# Patient Record
Sex: FEMALE | Race: BLACK OR AFRICAN AMERICAN | Employment: UNEMPLOYED | URBAN - METROPOLITAN AREA
[De-identification: names, ages, dates, MRNs, and addresses within clinical notes are randomized per-mention and may not be internally consistent; named-entity substitution may affect disease eponyms.]

---

## 2017-01-19 ENCOUNTER — OFFICE VISIT (OUTPATIENT)
Dept: FAMILY MEDICINE CLINIC | Age: 32
End: 2017-01-19

## 2017-01-19 VITALS
HEART RATE: 100 BPM | WEIGHT: 293 LBS | BODY MASS INDEX: 50.02 KG/M2 | OXYGEN SATURATION: 100 % | TEMPERATURE: 97.6 F | SYSTOLIC BLOOD PRESSURE: 124 MMHG | RESPIRATION RATE: 20 BRPM | HEIGHT: 64 IN | DIASTOLIC BLOOD PRESSURE: 82 MMHG

## 2017-01-19 DIAGNOSIS — G89.29 CHRONIC LEFT SHOULDER PAIN: Primary | ICD-10-CM

## 2017-01-19 DIAGNOSIS — M25.512 CHRONIC LEFT SHOULDER PAIN: Primary | ICD-10-CM

## 2017-01-19 NOTE — PROGRESS NOTES
HISTORY OF PRESENT ILLNESS  Augustine Holstein is a 36 y.o. female. HPI  Patient comes in today for left shoulder pain. Seem by Dr. Castillo Jennings 16. States she was supposed to go back to work, states she is on medical leave. Has not been able to do outpt PT because she did not have insurance and could not afford. When seen on 16, stated left shoulder still stiff. 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 Last year, was at [facility], vent on head and left shoulder. Has spasms off an on. 3 weeks ago, picked up daughter, and had a sharp pain in shoulder. Initially was unable to turn head. Has tried flexeril and Naprosyn. Only putting her to sleep. Denies numbness or tingling in left arm. On 16, she continued to have left shoulder pain, went to PT once. States shoulder worse after therapy. States shoulder was more swollen after PT. Went to [facility] PT. Did HEP, hot and cold compresses. Tramadol and prednisone did not help. Was taking Flexeril - only helped her to sleep. Denies numbness or tingling in left arm  Today states pain is unchanged. States she did not get insurance this month. Works at [employer], has demerits there, still employed, just needs updated FMLA form. Taking hydrocodone at night with some relief.   No Known Allergies    Past Medical History   Diagnosis Date    BMI 50.0-59.9, adult ([address] 2016    Chlamydia     Complication of anesthesia      spinal had to be placed twice last time    Obesities, morbid ([address]        Past Surgical History   Procedure Laterality Date    Pr  delivery only       x3    Hx gyn         Social History     Social History    Marital status: SINGLE     Spouse name: N/A    Number of children: N/A    Years of education: N/A     Occupational History    [employer]      Social History Main Topics    Smoking status: Former Smoker     Packs/day: 0.25     Years: 1.00     Quit date: 2016   [address] [place] [address] Smokeless tobacco: Never Used    Alcohol use No      Comment: quit approx. 1 mo. prior to pregnancy    Drug use: No    Sexual activity: Yes     Partners: Male     Birth control/ protection: Implant     Other Topics Concern    Not on file     Social History Narrative       Family History   Problem Relation Age of Onset    Asthma Brother     Cancer Maternal Grandfather     Stroke Maternal Grandfather     Cancer Paternal Grandfather     Psychiatric Disorder Mother     No Known Problems Father        Current Outpatient Prescriptions   Medication Sig    HYDROcodone-acetaminophen (NORCO) 5-325 mg per tablet Take 1 Tab by mouth every eight (8) hours as needed for Pain. Max Daily Amount: 3 Tabs.  etonogestrel 68 mg impl by SubDERmal route.  predniSONE (STERAPRED DS) 10 mg dose pack See administration instruction per 10mg dose pack    cyclobenzaprine (FLEXERIL) 10 mg tablet Take 0.5 Tabs by mouth three (3) times daily as needed for Muscle Spasm(s). No current facility-administered medications for this visit. Review of Systems   Musculoskeletal: Positive for joint pain (left shoulder pain) and myalgias. Skin: Negative. Neurological: Negative for tingling, sensory change and focal weakness. Vitals:    01/19/17 1003   BP: 124/82   Pulse: 100   Resp: 20   Temp: 97.6 °F (36.4 °C)   TempSrc: Oral   SpO2: 100%   Weight: 303 lb (137.4 kg)   Height: 5' 4\" (1.626 m)     Physical Exam   Constitutional: She is oriented to person, place, and time. Vital signs are normal. She appears well-developed and well-nourished. She is cooperative. Neck: Muscular tenderness (left trapezius) present. No spinous process tenderness present. Normal range of motion present. Cardiovascular: Normal rate. Pulses:       Radial pulses are 2+ on the right side, and 2+ on the left side.    Pulmonary/Chest: Effort normal.   Musculoskeletal:        Left shoulder: She exhibits tenderness (left trapezius), bony tenderness (over ac joint) and pain. She exhibits normal range of motion, no swelling, no effusion, no crepitus, no deformity, no spasm, normal pulse and normal strength. Muscle strength 5/5 BUEs, sensation intact   Neurological: She is alert and oriented to person, place, and time. Skin: Skin is warm and dry. Vitals reviewed. ASSESSMENT and PLAN    ICD-10-CM ICD-9-CM    1. Chronic left shoulder pain M25.512 719.41     G89.29 338.29      Encounter Diagnoses   Name Primary?  Chronic left shoulder pain Yes     No orders of the defined types were placed in this encounter. Amanda Monge was seen today for shoulder pain. Diagnoses and all orders for this visit:    Chronic left shoulder pain - continue hydrocodone prn pain. Patient unable to attend PT as she is without health insurance, is not working, and cannot afford. Continue HEP. Appointment scheduled with Dr. Jeff Jeter on 2/16/17 as patient should be covered under [employer] at that time. Completed [place] paperwork for [employer] (employer). Total visit time spent in direct patient care/contact:  25-39 minutes  Greater than 50% of visit time was spent counseling and coordinating care. Follow-up Disposition:  Return if symptoms worsen or fail to improve. I have reviewed the patient's allergies and made any necessary changes. Medical, procedural, social and family histories have been reviewed and updated as medically indicated. I have reconciled and/or revised patient medications in the EMR. I have discussed each diagnosis listed in this note with Saeed Gutiérrez and/or their family. I have discussed treatment options and the risk/benefit analysis of those options, including safe use of medications and possible medication side effects. Through the use of shared decision making we have agreed to the above plan. The patient has received an after-visit summary and questions were answered concerning future plans. Jaleesa Payne, CHRISSY-C    This note will not be viewable in [address]

## 2017-01-19 NOTE — LETTER
1/19/2017 10:49 AM 
 
Ms. Saeed [phone] Appointment  On February 16th, 2017 at 1pm with Dr. Nay Holt [address] [place] [address], [address] [place] [address] If need to change your appt or cancel please call at [phone] Sincerely, Noel Bosworth, NP

## 2017-02-07 ENCOUNTER — TELEPHONE (OUTPATIENT)
Dept: FAMILY MEDICINE CLINIC | Age: 32
End: 2017-02-07

## 2017-02-07 DIAGNOSIS — M25.512 CHRONIC LEFT SHOULDER PAIN: ICD-10-CM

## 2017-02-07 DIAGNOSIS — G89.29 CHRONIC LEFT SHOULDER PAIN: ICD-10-CM

## 2017-02-08 RX ORDER — HYDROCODONE BITARTRATE AND ACETAMINOPHEN 5; 325 MG/1; MG/1
1 TABLET ORAL
Qty: 20 TAB | Refills: 0 | Status: SHIPPED | OUTPATIENT
Start: 2017-02-08 | End: 2019-07-18

## 2017-03-16 ENCOUNTER — OFFICE VISIT (OUTPATIENT)
Dept: FAMILY MEDICINE CLINIC | Age: 32
End: 2017-03-16

## 2017-03-16 VITALS
SYSTOLIC BLOOD PRESSURE: 145 MMHG | RESPIRATION RATE: 20 BRPM | WEIGHT: 293 LBS | BODY MASS INDEX: 50.02 KG/M2 | TEMPERATURE: 97.4 F | HEIGHT: 64 IN | HEART RATE: 100 BPM | DIASTOLIC BLOOD PRESSURE: 98 MMHG | OXYGEN SATURATION: 99 %

## 2017-03-16 DIAGNOSIS — G89.29 CHRONIC LEFT SHOULDER PAIN: Primary | ICD-10-CM

## 2017-03-16 DIAGNOSIS — M25.512 CHRONIC LEFT SHOULDER PAIN: Primary | ICD-10-CM

## 2017-03-16 NOTE — PROGRESS NOTES
HISTORY OF PRESENT ILLNESS  Deyanira Bradley is a 36 y.o. female. HPI  Patient comes in today for left shoulder pain. Saw Dr. Yuriy Jones end of February, had cortisone injection. MRI was scheduled for 3/9/17. Did not have MRI because she did not have $258 to pay [facility] to cover copay for [facility]. States she is still having left shoulder pain. Unable to work until she has further workup. States left shoulder is still popping. No Known Allergies    Past Medical History:   Diagnosis Date    BMI 50.0-59.9, adult ([facility] 2016    Chlamydia     Complication of anesthesia     spinal had to be placed twice last time    Obesities, morbid ([facility]        Past Surgical History:   Procedure Laterality Date     DELIVERY ONLY      x3    HX GYN         Social History     Social History    Marital status: SINGLE     Spouse name: N/A    Number of children: N/A    Years of education: N/A     Occupational History    [employer]      Social History Main Topics    Smoking status: Former Smoker     Packs/day: 0.25     Years: 1.00     Quit date: 2016    Smokeless tobacco: Never Used    Alcohol use No      Comment: quit approx. 1 mo. prior to pregnancy    Drug use: No    Sexual activity: Yes     Partners: Male     Birth control/ protection: Implant     Other Topics Concern    Not on file     Social History Narrative       Family History   Problem Relation Age of Onset    Asthma Brother     Cancer Maternal Grandfather     Stroke Maternal Grandfather     Cancer Paternal Grandfather     Psychiatric Disorder Mother     No Known Problems Father        Current Outpatient Prescriptions   Medication Sig    HYDROcodone-acetaminophen (NORCO) 5-325 mg per tablet Take 1 Tab by mouth every eight (8) hours as needed for Pain. Max Daily Amount: 3 Tabs.  etonogestrel 68 mg impl by SubDERmal route. No current facility-administered medications for this visit.       Review of Systems   Musculoskeletal: Positive for joint pain (left shoulder pain) and myalgias. Skin: Negative. Neurological: Negative for tingling, sensory change and focal weakness. Vitals:    03/16/17 1526   BP: (!) 145/98   Pulse: 100   Resp: 20   Temp: 97.4 °F (36.3 °C)   TempSrc: Oral   SpO2: 99%   Weight: 309 lb (140.2 kg)   Height: 5' 4\" (1.626 m)     Physical Exam   Constitutional: She is oriented to person, place, and time. Vital signs are normal. She appears well-developed and well-nourished. She is cooperative. Neck: Muscular tenderness (left trapezius) present. No spinous process tenderness present. Normal range of motion present. Cardiovascular: Normal rate. Pulses:       Radial pulses are 2+ on the right side, and 2+ on the left side. Pulmonary/Chest: Effort normal.   Musculoskeletal:        Left shoulder: She exhibits tenderness (left trapezius), bony tenderness (over ac joint) and pain. She exhibits normal range of motion, no swelling, no effusion, no crepitus, no deformity, no spasm, normal pulse and normal strength. Muscle strength 5/5 BUEs, sensation intact   Neurological: She is alert and oriented to person, place, and time. Skin: Skin is warm and dry. Vitals reviewed. ASSESSMENT and PLAN    ICD-10-CM ICD-9-CM    1. Chronic left shoulder pain M25.512 719.41     G89.29 338.29      Encounter Diagnoses   Name Primary?  Chronic left shoulder pain Yes     No orders of the defined types were placed in this encounter. April Bahena was seen today for shoulder pain. Diagnoses and all orders for this visit:    Chronic left shoulder pain  -     Spoke with Crystal in Dr. Keke Thomas office. She will arrange for patient to have MRI at a [facility] facility. Discussed with patient importance of obtaining MRI and need to follow up with Dr. Keke Thomas. Dr. Keke Thomas office will call patient with date/time fo rMRI of left shoulder.         Follow-up Disposition: Not on File    I have reviewed the patient's allergies and made any necessary changes. Medical, procedural, social and family histories have been reviewed and updated as medically indicated. I have reconciled and/or revised patient medications in the EMR. I have discussed each diagnosis listed in this note with Ovidio Roberson and/or their family. I have discussed treatment options and the risk/benefit analysis of those options, including safe use of medications and possible medication side effects. Through the use of shared decision making we have agreed to the above plan. The patient has received an after-visit summary and questions were answered concerning future plans. Jaleesa Payne, EUGENIOP-C    This note will not be viewable in [facility].

## 2019-04-29 ENCOUNTER — TELEPHONE (OUTPATIENT)
Dept: FAMILY MEDICINE CLINIC | Age: 34
End: 2019-04-29

## 2019-04-29 NOTE — TELEPHONE ENCOUNTER
Spoke with pt and she states she fell and hit her stomach on the ground. Confirmed pt is pregnant and per Jessica Evans NP pt needs to go to an OB. Pt states does not have insurance at this time. Gave pt number to Milena Banks NP at [facility] to have pt get in a be seen. Pt verbalized understanding.

## 2019-04-29 NOTE — TELEPHONE ENCOUNTER
Pt p#[phone], pt would like to be seen, she is pregnant and fell down yeseterday, states she is banged up and needs to be seen.

## 2019-07-18 ENCOUNTER — HOSPITAL ENCOUNTER (OUTPATIENT)
Age: 34
Discharge: HOME | End: 2019-07-19
Attending: EMERGENCY MEDICINE | Admitting: SPECIALIST
Payer: MEDICAID

## 2019-07-18 DIAGNOSIS — O16.3 HYPERTENSION AFFECTING PREGNANCY IN THIRD TRIMESTER: Primary | ICD-10-CM

## 2019-07-18 DIAGNOSIS — R51.9 HEADACHE IN PREGNANCY, ANTEPARTUM, THIRD TRIMESTER: ICD-10-CM

## 2019-07-18 DIAGNOSIS — H53.8 BLURRY VISION: ICD-10-CM

## 2019-07-18 DIAGNOSIS — O26.893 HEADACHE IN PREGNANCY, ANTEPARTUM, THIRD TRIMESTER: ICD-10-CM

## 2019-07-18 DIAGNOSIS — Z3A.28 28 WEEKS GESTATION OF PREGNANCY: ICD-10-CM

## 2019-07-18 PROCEDURE — 75810000275 HC EMERGENCY DEPT VISIT NO LEVEL OF CARE

## 2019-07-18 RX ORDER — GUAIFENESIN 100 MG/5ML
81 LIQUID (ML) ORAL DAILY
COMMUNITY

## 2019-07-18 RX ORDER — LABETALOL 200 MG/1
200 TABLET, FILM COATED ORAL 2 TIMES DAILY
COMMUNITY

## 2019-07-19 VITALS
WEIGHT: 293 LBS | HEART RATE: 99 BPM | BODY MASS INDEX: 47.09 KG/M2 | SYSTOLIC BLOOD PRESSURE: 116 MMHG | HEIGHT: 66 IN | TEMPERATURE: 98.7 F | OXYGEN SATURATION: 99 % | DIASTOLIC BLOOD PRESSURE: 86 MMHG | RESPIRATION RATE: 18 BRPM

## 2019-07-19 PROBLEM — O10.913 CHRONIC HYPERTENSION COMPLICATING OR REASON FOR CARE DURING PREGNANCY, THIRD TRIMESTER: Status: ACTIVE | Noted: 2019-07-19

## 2019-07-19 PROBLEM — R51.9 HEADACHE IN PREGNANCY, ANTEPARTUM, THIRD TRIMESTER: Status: ACTIVE | Noted: 2019-07-19

## 2019-07-19 PROBLEM — O26.893 HEADACHE IN PREGNANCY, ANTEPARTUM, THIRD TRIMESTER: Status: ACTIVE | Noted: 2019-07-19

## 2019-07-19 LAB
ALT SERPL-CCNC: 35 U/L (ref 12–78)
ALT SERPL-CCNC: 38 U/L (ref 12–78)
AST SERPL-CCNC: 29 U/L (ref 15–37)
AST SERPL-CCNC: 53 U/L (ref 15–37)
BASOPHILS # BLD: 0.1 K/UL (ref 0–0.1)
BASOPHILS NFR BLD: 0 % (ref 0–1)
CREAT UR-MCNC: 43.3 MG/DL
DIFFERENTIAL METHOD BLD: ABNORMAL
EOSINOPHIL # BLD: 0.1 K/UL (ref 0–0.4)
EOSINOPHIL NFR BLD: 1 % (ref 0–7)
ERYTHROCYTE [DISTWIDTH] IN BLOOD BY AUTOMATED COUNT: 13.5 % (ref 11.5–14.5)
HCT VFR BLD AUTO: 42.3 % (ref 35–47)
HGB BLD-MCNC: 13.9 G/DL (ref 11.5–16)
IMM GRANULOCYTES # BLD AUTO: 0.1 K/UL (ref 0–0.04)
IMM GRANULOCYTES NFR BLD AUTO: 1 % (ref 0–0.5)
LYMPHOCYTES # BLD: 3.5 K/UL (ref 0.8–3.5)
LYMPHOCYTES NFR BLD: 22 % (ref 12–49)
MCH RBC QN AUTO: 29.2 PG (ref 26–34)
MCHC RBC AUTO-ENTMCNC: 32.9 G/DL (ref 30–36.5)
MCV RBC AUTO: 88.9 FL (ref 80–99)
MONOCYTES # BLD: 1.2 K/UL (ref 0–1)
MONOCYTES NFR BLD: 8 % (ref 5–13)
NEUTS SEG # BLD: 11.2 K/UL (ref 1.8–8)
NEUTS SEG NFR BLD: 68 % (ref 32–75)
NRBC # BLD: 0 K/UL (ref 0–0.01)
NRBC BLD-RTO: 0 PER 100 WBC
PLATELET # BLD AUTO: 342 K/UL (ref 150–400)
PMV BLD AUTO: 9.7 FL (ref 8.9–12.9)
PROT UR-MCNC: <5 MG/DL (ref 0–11.9)
PROT/CREAT UR-RTO: NORMAL
RBC # BLD AUTO: 4.76 M/UL (ref 3.8–5.2)
WBC # BLD AUTO: 16.2 K/UL (ref 3.6–11)

## 2019-07-19 PROCEDURE — 36415 COLL VENOUS BLD VENIPUNCTURE: CPT

## 2019-07-19 PROCEDURE — 74011250637 HC RX REV CODE- 250/637: Performed by: OBSTETRICS & GYNECOLOGY

## 2019-07-19 PROCEDURE — 84450 TRANSFERASE (AST) (SGOT): CPT

## 2019-07-19 PROCEDURE — 74011250637 HC RX REV CODE- 250/637: Performed by: SPECIALIST

## 2019-07-19 PROCEDURE — 99284 EMERGENCY DEPT VISIT MOD MDM: CPT

## 2019-07-19 PROCEDURE — 84156 ASSAY OF PROTEIN URINE: CPT

## 2019-07-19 PROCEDURE — 84460 ALANINE AMINO (ALT) (SGPT): CPT

## 2019-07-19 PROCEDURE — 85025 COMPLETE CBC W/AUTO DIFF WBC: CPT

## 2019-07-19 RX ORDER — LABETALOL 200 MG/1
200 TABLET, FILM COATED ORAL 2 TIMES DAILY
Status: DISCONTINUED | OUTPATIENT
Start: 2019-07-19 | End: 2019-07-19 | Stop reason: HOSPADM

## 2019-07-19 RX ORDER — OXYCODONE AND ACETAMINOPHEN 5; 325 MG/1; MG/1
1 TABLET ORAL
Status: COMPLETED | OUTPATIENT
Start: 2019-07-19 | End: 2019-07-19

## 2019-07-19 RX ADMIN — OXYCODONE AND ACETAMINOPHEN 1 TABLET: 5; 325 TABLET ORAL at 00:44

## 2019-07-19 RX ADMIN — MULTIPLE VITAMINS W/ MINERALS TAB 1 TABLET: TAB at 10:34

## 2019-07-19 RX ADMIN — LABETALOL HYDROCHLORIDE 200 MG: 200 TABLET, FILM COATED ORAL at 10:34

## 2019-07-19 NOTE — PROGRESS NOTES
1020 Pt given discharge instructions understands how to do 24 hour urine. . Iv removed.  Pt to see Dr Paula Bowers today at [address]  1030 [id number] with doppler is [id number]  1030 ambulatory out

## 2019-07-19 NOTE — ED PROVIDER NOTES
EMERGENCY DEPARTMENT HISTORY AND PHYSICAL EXAM      Date: 2019  Patient Name: Mirza Lira    History of Presenting Illness     Chief Complaint   Patient presents with    Hypertension       History Provided By: Patient and Patient's     Susan Chapa is a 30 y.o. Female with current pregnancy at 28 weeks and 6 days presenting with acute onset of headache, blurry vision and elevated BP readings PTA. Pt states she has been dealing with HTN since pregnancy and tonight it was 190/97 after class at 22:00. She reports a mild frontal headache 7/10 with associated blurry vision. She states the headache has subsided without intervention. Pt denies any abdominal pain, contractions, vaginal bleeding or gush of fluid. She reports good fetal movement. She reports good compliance of her Labetalol for which she takes for her HTN. Pt denies any other alleviating or exacerbating factors. Additionally, pt specifically denies any recent fever, chills, nausea, vomiting, abdominal pain, CP, SOB, lightheadedness, dizziness, numbness, weakness, BLE swelling, heart palpitations, urinary sxs, diarrhea, constipation, melena, hematochezia, cough, or congestion. There are no other complaints, changes, or physical findings at this time. Current Outpatient Medications   Medication Sig Dispense Refill    labetalol (NORMODYNE) 200 mg tablet Take 200 mg by mouth two (2) times a day.  prenatal 91/OFGO fum/folic/dha (PRENATAL-1 PO) Take  by mouth.  aspirin 81 mg chewable tablet Take 81 mg by mouth daily.  etonogestrel 68 mg impl by SubDERmal route.        Past History     Past Medical History:  Past Medical History:   Diagnosis Date    BMI 50.0-59.9, adult ([address] 2016    Chlamydia     Complication of anesthesia     spinal had to be placed twice last time    Hypertension     Obesities, morbid ([address]        Past Surgical History:  Past Surgical History:   Procedure Laterality Date     DELIVERY ONLY      x3    HX GYN         Family History:  Family History   Problem Relation Age of Onset    Asthma Brother     Cancer Maternal Grandfather     Stroke Maternal Grandfather     Cancer Paternal Grandfather     Psychiatric Disorder Mother     No Known Problems Father        Social History:  Social History     Tobacco Use    Smoking status: Former Smoker     Packs/day: 0.25     Years: 1.00     Pack years: 0.25     Last attempt to quit: 6/2/2016     Years since quitting: 3.1    Smokeless tobacco: Never Used   Substance Use Topics    Alcohol use: No     Alcohol/week: 0.0 standard drinks     Comment: quit approx. 1 mo. prior to pregnancy    Drug use: No       Allergies:  No Known Allergies      Review of Systems   Review of Systems   Constitutional: Negative. Negative for chills and fever. HENT: Negative. Negative for congestion, facial swelling, rhinorrhea, sore throat, trouble swallowing and voice change. Eyes: Positive for visual disturbance. Respiratory: Negative. Negative for apnea, cough, chest tightness, shortness of breath and wheezing. Cardiovascular: Negative. Negative for chest pain, palpitations and leg swelling. Gastrointestinal: Negative. Negative for abdominal distention, abdominal pain, blood in stool, constipation, diarrhea, nausea and vomiting. Endocrine: Negative. Negative for cold intolerance, heat intolerance and polyuria. Genitourinary: Negative. Negative for difficulty urinating, dysuria, flank pain, frequency, hematuria and urgency. Musculoskeletal: Negative. Negative for arthralgias, back pain, myalgias, neck pain and neck stiffness. Skin: Negative. Negative for color change and rash. Neurological: Positive for headaches. Negative for dizziness, syncope, facial asymmetry, speech difficulty, weakness, light-headedness and numbness. Hematological: Negative. Does not bruise/bleed easily. Psychiatric/Behavioral: Negative.   Negative for confusion and self-injury. The patient is not nervous/anxious. Physical Exam   Physical Exam   Constitutional: She is oriented to person, place, and time. She appears well-developed and well-nourished. No distress. HENT:   Head: Normocephalic and atraumatic. Mouth/Throat: Oropharynx is clear and moist. No oropharyngeal exudate. Eyes: Pupils are equal, round, and reactive to light. Conjunctivae and EOM are normal.   Neck: Normal range of motion. Cardiovascular: Normal rate, regular rhythm and normal heart sounds. Exam reveals no gallop and no friction rub. No murmur heard. Pulmonary/Chest: Effort normal and breath sounds normal. No respiratory distress. She has no wheezes. She has no rales. She exhibits no tenderness. Abdominal: Soft. Bowel sounds are normal. She exhibits no distension and no mass. There is no tenderness. There is no rebound and no guarding. Gravid abdomen   Musculoskeletal: Normal range of motion. She exhibits no edema, tenderness or deformity. Neurological: She is alert and oriented to person, place, and time. She displays normal reflexes. No cranial nerve deficit. She exhibits normal muscle tone. Coordination normal.   Skin: Skin is warm. No rash noted. She is not diaphoretic. Psychiatric: She has a normal mood and affect. Nursing note and vitals reviewed. Diagnostic Study Results     Labs -   No results found for this or any previous visit (from the past 12 hour(s)). Radiologic Studies -   No orders to display     CT Results  (Last 48 hours)    None        CXR Results  (Last 48 hours)    None            Medical Decision Making   I am the first provider for this patient. I reviewed the vital signs, available nursing notes, past medical history, past surgical history, family history and social history. Vital Signs-Reviewed the patient's vital signs.   Visit Vitals  /86 (BP 1 Location: Left arm, BP Patient Position: Sitting)   Pulse 99   Temp 98.7 °F (37.1 °C) Resp 18   Ht 5' 6\" (1.676 m)   Wt 148.3 kg (326 lb 15.1 oz)   SpO2 99%   BMI 52.77 kg/m²       Pulse Oximetry Analysis - 99% on RA    Cardiac Monitor:   Rate: 99 bpm  Rhythm: Normal Sinus Rhythm      Records Reviewed: Nursing Notes and Old Medical Records    Provider Notes (Medical Decision Making):   DDX: tension headache, dehydration, pre-eclampsia    Plan:  Send to L&D for further evaluation. ED Course:   Initial assessment performed. The patients presenting problems have been discussed, and they are in agreement with the care plan formulated and outlined with them. I have encouraged them to ask questions as they arise throughout their visit. Pt without evidence of acute, non-obstetric emergency at this time, will send to L&D for further evaluation and work up. Should pt have continued sx but found to be without active labor, recommend to return to ED. Disposition: Transfer to L&D    PLAN:  1. Admit to L&D. Admit Note:  Pt is being transported to L&D for further workup. The reasons for their admission have been discussed with them and/or available family. They convey agreement and understanding for the need to be admitted and for their admission diagnosis. Consultation has been made with the inpatient physician specialist for hospitalization. Diagnosis     Clinical Impression:     ICD-10-CM ICD-9-CM   1. Hypertension affecting pregnancy in third trimester O16.3 642.93   2. Headache in pregnancy, antepartum, third trimester O26.893 646.83    R51 784.0   3. 28 weeks gestation of pregnancy Z3A.28 V22.2   4. Blurry vision H53.8 368.8         Attestations:  I personally performed the services described in this documentation on this date 7/18/2019 for [facility]. I have reviewed and verified that all the information is accurate and complete. Chiki Mckeon MD      This note will not be viewable in [address]

## 2019-07-19 NOTE — ED TRIAGE NOTES
Pt states she is being treated for high blood pressure since she has been pregnant and tonight was 190/97 and she is having blurred vision. Pt states they are checking her for preeclampsia. Pt has negative FAST exam and bp in triage is 93/55. Pt denies any pain.

## 2019-07-19 NOTE — DISCHARGE INSTRUCTIONS
Pt to return to Dr Nataliya Bolaños on Monday with 24 hour urine jugs. Weeks 30 to 32 of Your Pregnancy: Care Instructions  Your Care Instructions    You have made it to the final months of your pregnancy. By now, your baby is really starting to look like a baby, with hair and plump skin. As you enter the final weeks of pregnancy, the reality of having a baby may start to set in. This is the time to settle on a name, get your household in order, set up a safe nursery, and find quality  if needed. Doing these things in advance will allow you to focus on caring for and enjoying your new baby. You may also want to have a tour of your hospital's labor and delivery unit to get a better idea of what to expect while you are in the hospital.  During these last months, it is very important to take good care of yourself and pay attention to what your body needs. If your doctor says it is okay for you to work, don't push yourself too hard. Use the tips provided in this care sheet to ease heartburn and care for varicose veins. If you haven't already had the Tdap shot during this pregnancy, talk to your doctor about getting it. It will help protect your  against pertussis infection. Follow-up care is a key part of your treatment and safety. Be sure to make and go to all appointments, and call your doctor if you are having problems. It's also a good idea to know your test results and keep a list of the medicines you take. How can you care for yourself at home? Pay attention to your baby's movements  · You should feel your baby move several times every day. · Your baby now turns less, and kicks and jabs more. · Your baby sleeps 20 to 45 minutes at a time and is more active at certain times of day. · If your doctor wants you to count your baby's kicks:  ? Empty your bladder, and lie on your side or relax in a comfortable chair. ? Write down your start time. ? Pay attention only to your baby's movements.  Count any movement except hiccups. ? After you have counted 10 movements, write down your stop time. ? Write down how many minutes it took for your baby to move 10 times. ? If an hour goes by and you have not recorded 10 movements, have something to eat or drink and then count for another hour. If you do not record 10 movements in either hour, call your doctor. Ease heartburn  · Eat small, frequent meals. · Do not eat chocolate, peppermint, or very spicy foods. Avoid drinks with caffeine, such as coffee, tea, and sodas. · Avoid bending over or lying down after meals. · Talk a short walk after you eat. · If heartburn is a problem at night, do not eat for 2 hours before bedtime. · Take antacids like Mylanta, Maalox, Rolaids, or Tums. Do not take antacids that have sodium bicarbonate. Care for varicose veins  · Varicose veins are blood vessels that stretch out with the extra blood during pregnancy. Your legs may ache or throb. Most varicose veins will go away after the birth. · Avoid standing for long periods of time. Sit with your legs crossed at the ankles, not the knees. · Sit with your feet propped up. · Avoid tight clothing or stockings. Wear support hose. · Exercise regularly. Try walking for at least 30 minutes a day. Where can you learn more? Go to http://wes-laila.info/. Enter R973 in the search box to learn more about \"Weeks 30 to 32 of Your Pregnancy: Care Instructions. \"  Current as of: September 5, 2018  Content Version: 11.9  © 0678-6733 [employer]. Care instructions adapted under license by [employer] (which disclaims liability or warranty for this information). If you have questions about a medical condition or this instruction, always ask your healthcare professional. [address] any warranty or liability for your use of this information.             High Blood Pressure in Pregnancy: Care Instructions  Your Care Instructions    High blood pressure (hypertension) means that the force of blood against your artery walls is too strong. Mild high blood pressure during pregnancy is not usually dangerous. Your doctor will probably just want to watch you closely. But when blood pressure is very high, it can reduce oxygen to your baby. This can affect how well your baby grows. High blood pressure also means that you are at higher risk for:  · Preeclampsia. This is a problem that includes high blood pressure and damage to your liver or kidneys. It can also reduce how much oxygen your baby gets. In some cases, it leads to eclampsia. Eclampsia causes seizures. · Placental abruption. This is a problem when the placenta separates from the uterus before birth. It prevents the baby from getting enough oxygen and nutrients. Sometimes it can cause death for the baby and the mother. To prevent problems for you or your baby, you will have to check your blood pressure often. You will do this until after your baby is born. If your blood pressure rises suddenly or is very high during your pregnancy, your doctor may prescribe medicines. They can usually control blood pressure. If your blood pressure affects your or your baby's health, your doctor may need to deliver your baby early. After your baby is born, your blood pressure will probably improve. But sometimes blood pressure problems continue after birth. Follow-up care is a key part of your treatment and safety. Be sure to make and go to all appointments, and call your doctor if you are having problems. It's also a good idea to know your test results and keep a list of the medicines you take. How can you care for yourself at home? · Take and write down your blood pressure at home if your doctor tells you to. · Take your medicines exactly as prescribed. Call your doctor if you think you are having a problem with your medicine. · Do not smoke.  If you need help quitting, talk to your doctor about stop-smoking programs and medicines. These can increase your chances of quitting for good. · Do not gain too much weight during your pregnancy. Talk to your doctor about how much weight gain is healthy. · Eat a healthy diet. · If your doctor says it's okay, get regular exercise. Walking or swimming several times a week can be healthy for you and your baby. · Reduce stress, and find time to relax. This is very important if you continue to work or have a busy schedule. It's also important if you have small children at home. When should you call for help? Call 911 anytime you think you may need emergency care. For example, call if:    · You passed out (lost consciousness).     · You have a seizure.    Call your doctor now or seek immediate medical care if:    · You have symptoms of preeclampsia, such as:  ? Sudden swelling of your face, hands, or feet. ? New vision problems (such as dimness or blurring). ? A severe headache.     · Your blood pressure is higher than it should be or rises suddenly.     · You have new nausea or vomiting.     · You think that you are in labor.     · You have pain in your belly or pelvis.    Watch closely for changes in your health, and be sure to contact your doctor if:    · You gain weight rapidly. Where can you learn more? Go to http://wes-laila.info/. Enter [phone] in the search box to learn more about \"High Blood Pressure in Pregnancy: Care Instructions. \"  Current as of: September 5, 2018  Content Version: 11.9  © 8615-6981 [employer]. Care instructions adapted under license by [employer] (which disclaims liability or warranty for this information). If you have questions about a medical condition or this instruction, always ask your healthcare professional. [address] any warranty or liability for your use of this information.

## 2019-07-19 NOTE — PROGRESS NOTES
5891 Bedside sbar report from Vera Gusman in to see patient. POC discussed. Depending on labs being drawn now pt will be discharged. To see Dr Shelia Warren today. To do 24 hour urine at home and return to office on Monday. To start 24 hour urine on Sunday am. Pt verbalized understanding. jmj    0800 Pt states she came yesterday with an anxiety attack due to fighting with fob. Pt states she has anxiety. Lives in [place] but traveling up here to stay with her Sister. Her sister is helping with the kids. jmj    0802 Confirmed POC with Dr Moiz Gusman for discharge and 24 hour urine.  Conrad Oneal

## 2019-07-19 NOTE — PROGRESS NOTES
High Risk Obstetrics Progress Note    Name: Caryle Sleeper MRN: [id number]  SSN: [id number]    YOB: 1985  Age: 30 y.o. Sex: female      Subjective:      LOS: 0 days    Estimated Date of Delivery: 10/5/19   Gestational Age Today: 30w11d     Patient admitted for chronic hypertension and severe obesity. States she does have mild headache . Objective:     Vitals:  Blood pressure 104/59, pulse (!) 102, temperature 98.3 °F (36.8 °C), resp. rate 18, height 5' 6\" (1.676 m), weight 148.3 kg (326 lb 15.1 oz), SpO2 99 %. Temp (24hrs), Av.3 °F (36.8 °C), Min:98.3 °F (36.8 °C), Max:98.3 °F (56.7 °C)    Systolic (58EZN), GWK:374 , Min:93 , BR      Diastolic (02FGQ), EOQ:61, Min:55, Max:91       Intake and Output:         Physical Exam:  Deferred       Membranes:  Intact    Uterine Activity:  None    Fetal Heart Rate:  Baseline: 140 per minute        Labs:   Recent Results (from the past 36 hour(s))   CBC WITH AUTOMATED DIFF    Collection Time: 19 12:18 AM   Result Value Ref Range    WBC 16.2 (H) 3.6 - 11.0 K/uL    RBC 4.76 3.80 - 5.20 M/uL    HGB 13.9 11.5 - 16.0 g/dL    HCT 42.3 35.0 - 47.0 %    MCV 88.9 80.0 - 99.0 FL    MCH 29.2 26.0 - 34.0 PG    MCHC 32.9 30.0 - 36.5 g/dL    RDW 13.5 11.5 - 14.5 %    PLATELET 653 038 - 467 K/uL    MPV 9.7 8.9 - 12.9 FL    NRBC 0.0 0  WBC    ABSOLUTE NRBC 0.00 0.00 - 0.01 K/uL    NEUTROPHILS 68 32 - 75 %    LYMPHOCYTES 22 12 - 49 %    MONOCYTES 8 5 - 13 %    EOSINOPHILS 1 0 - 7 %    BASOPHILS 0 0 - 1 %    IMMATURE GRANULOCYTES 1 (H) 0.0 - 0.5 %    ABS. NEUTROPHILS 11.2 (H) 1.8 - 8.0 K/UL    ABS. LYMPHOCYTES 3.5 0.8 - 3.5 K/UL    ABS. MONOCYTES 1.2 (H) 0.0 - 1.0 K/UL    ABS. EOSINOPHILS 0.1 0.0 - 0.4 K/UL    ABS. BASOPHILS 0.1 0.0 - 0.1 K/UL    ABS. IMM.  GRANS. 0.1 (H) 0.00 - 0.04 K/UL    DF AUTOMATED     ALT    Collection Time: 19 12:18 AM   Result Value Ref Range    ALT (SGPT) 38 12 - 78 U/L   AST    Collection Time: 19 12:18 AM   Result Value Ref Range    AST (SGOT) 53 (H) 15 - 37 U/L   PROTEIN/CREATININE RATIO, URINE    Collection Time: 07/19/19 12:18 AM   Result Value Ref Range    Protein, urine random <5 0.0 - 11.9 mg/dL    Creatinine, urine 43.30 mg/dL    Protein/Creat. urine Ratio Cannot be calculated         Assessment and Plan: Active Problems:    Chronic hypertension complicating or reason for care during pregnancy, third trimester (7/19/2019)      Headache in pregnancy, antepartum, third trimester (7/19/2019)       obesity weight gained  Labs wnl, will repeat discharge home , bring home 24 hour urine, patient has an appointment with M this afternoon.

## 2019-07-19 NOTE — PROGRESS NOTES
Pt transferred to room 3186. Resting quietly in bed at this time with no complaints. Juice and crackers given per pt request.Continue to monitor.

## 2019-07-19 NOTE — PROGRESS NOTES
Call placed to Dr. Humphrey Gregory and notified of patients lab results. Order received for patient to remain overnight for repeat labs in the am. Patient may come off of the monitor.

## 2019-07-19 NOTE — PROGRESS NOTES
Pt arrives from ER with c/o HA 7/10 left frontal. And \"my BP is high\" - taken at h ome. Is on labetelol. No visual changes nor epigastric pain,. Does not appear to be in labor. To BR to void and gown- plan of care discussion begun, previos c/s x4    0010 P.  Brenda Mac RN made Dr Humphrey Gregory aware of pt being here, her complaint- orders received

## 2019-07-19 NOTE — H&P
History & Physical    Name: Alejandra Helm MRN: [id number]  SSN: [id number]    YOB: 1985  Age: 30 y.o. Sex: female        Subjective:   CC: headache and elevated BP at home  Estimated Date of Delivery: 10/5/19  OB History    Para Term  AB Living   6 5 4     4   SAB TAB Ectopic Molar Multiple Live Births             4      # Outcome Date GA Lbr Anderson/2nd Weight Sex Delivery Anes PTL Lv   6 Current            5 Term 10/17/13 39w0d  3.135 kg F  LO EPIDURAL AN N MICHELLE   4 Term 11/03/10 39w4d  3.72 kg M  LO SPINAL AN N MICHELLE   3 Term 08 39w0d   M  LO Spinal N MICHELLE   2 Para 07    M  LO EPI N MICHELLE   1 Term                Ms. Maia Rosas presents with pregnancy at 28w6d for elevated BP at home of 197/97 after class at 2200 hrs. She had associated frontal headache 7/10 and blurry vision which the headache has significantly improved after treatment and the latter has completely resolved. All BPs in the hospital have been normal. Pt denies RUQ/epigastric pain. Denies ROM,vag bleeding,contractions,abdominal pain. She notes good FM. Prenatal course was complicated by chronic hypertension. Please see prenatal records for details. Past Medical History:   Diagnosis Date    BMI 50.0-59.9, adult ([address] 2016    Chlamydia     Complication of anesthesia     spinal had to be placed twice last time    Hypertension     Obesities, morbid ([address]      Past Surgical History:   Procedure Laterality Date     DELIVERY ONLY      x3    HX GYN       Social History     Occupational History    Occupation: [employer]   Tobacco Use    Smoking status: Former Smoker     Packs/day: 0.25     Years: 1.00     Pack years: 0.25     Last attempt to quit: 2016     Years since quitting: 3.1    Smokeless tobacco: Never Used   Substance and Sexual Activity    Alcohol use: No     Alcohol/week: 0.0 standard drinks     Comment: quit approx. 1 mo. prior to pregnancy    Drug use:  No  Sexual activity: Yes     Partners: Male     Birth control/protection: Implant     Family History   Problem Relation Age of Onset    Asthma Brother     Cancer Maternal Grandfather     Stroke Maternal Grandfather     Cancer Paternal Grandfather     Psychiatric Disorder Mother     No Known Problems Father      No Known Allergies  Prior to Admission medications    Medication Sig Start Date End Date Taking? Authorizing Provider   labetalol (NORMODYNE) 200 mg tablet Take 200 mg by mouth two (2) times a day. Yes Other, MD Mayra   prenatal 26/CHTN fum/folic/dha (PRENATAL-1 PO) Take  by mouth. Yes Other, MD Mayra   aspirin 81 mg chewable tablet Take 81 mg by mouth daily. Yes Provider, Historical   etonogestrel 68 mg impl by SubDERmal route. Provider, Historical        Review of Systems   Constitutional: Negative for chills, diaphoresis and fever. HENT: Negative for congestion, ear pain, mouth sores, postnasal drip, rhinorrhea, sinus pressure and sore throat. Eyes: Positive for visual disturbance. Negative for photophobia. Respiratory: Negative for cough, chest tightness, shortness of breath and wheezing. Cardiovascular: Negative for chest pain, palpitations and leg swelling. Gastrointestinal: Negative for abdominal pain, blood in stool, constipation, diarrhea, nausea and vomiting. Genitourinary: Negative for dysuria, frequency, genital sores, hematuria, vaginal bleeding and vaginal discharge. Musculoskeletal: Negative for arthralgias and myalgias. Skin: Negative for color change, pallor and rash. Neurological: Positive for headaches. Negative for dizziness, seizures and light-headedness. Psychiatric/Behavioral: Negative for agitation, behavioral problems, confusion and decreased concentration.        Objective:     Vitals:  Vitals:    07/18/19 2251 07/18/19 2312 07/19/19 0005   BP: 93/55 130/82 128/76   Pulse: (!) 114  (!) 102   Resp: 18     Temp: 98.3 °F (36.8 °C)     SpO2: 99%  99% Weight: 148.3 kg (326 lb 15.1 oz)     Height: 5' 6\" (1.676 m)          Physical Exam   Constitutional: She is oriented to person, place, and time. She appears well-developed and well-nourished. No distress. HENT:   Head: Normocephalic and atraumatic. Eyes: EOM are normal. Right eye exhibits no discharge. Left eye exhibits no discharge. No scleral icterus. Neck: Normal range of motion. Neck supple. No JVD present. No tracheal deviation present. No thyromegaly present. Cardiovascular: Normal rate, regular rhythm and normal heart sounds. Exam reveals no gallop and no friction rub. No murmur heard. Pulmonary/Chest: Effort normal and breath sounds normal. No respiratory distress. She has no wheezes. She has no rales. Abdominal: Soft. Bowel sounds are normal. She exhibits no distension. There is no tenderness. There is no rebound and no guarding. Musculoskeletal: Normal range of motion. She exhibits no edema, tenderness or deformity. Lymphadenopathy:     She has no cervical adenopathy. Neurological: She is alert and oriented to person, place, and time. She has normal reflexes. She displays normal reflexes. She exhibits normal muscle tone. Skin: Skin is warm and dry. No rash noted. She is not diaphoretic. No erythema. No pallor. Psychiatric: She has a normal mood and affect.  Her behavior is normal. Judgment and thought content normal.   Back: Neg CVAT    Uterine Activity: None  Membranes: Intact  Fetal Heart Rate: Cat 1     Prenatal Labs:   Lab Results   Component Value Date/Time    ABO/Rh(D) O POS 10/17/2013 10:15 AM    Rubella, External Immune, 41 04/11/2013    GrBStrep, External Positive 09/20/2013    HBsAg, External Non Reactive 07/19/2013    HIV, External Negative 04/11/2013    RPR, External Non Reactive 07/19/2013    Gonorrhea, External Negative 04/11/2013    Chlamydia, External Negative 04/11/2013    ABO,Rh O positive 04/11/2013          Impression/Plan:      1)CHTN on 200 mg Labetalol po BID    2)R/O SIP-BPs normal in hospital but one LFT is minimally elevated. Pt with associated headache and visual changes. 3)IUP at 28w6d    Plan:  Serial BPs, repeat labs, start 24 hour urine. Continue Labetalol 200 mg po BID. Plan per result.

## 2019-07-19 NOTE — PROGRESS NOTES
Call placed to Dr. Polo Vidal and notified of patients arrival from ED with c/o [address]. Pt on Labetalol and considered High Risk. Orders received to obtain urine for P/C ratio and blood for CBC, ALT and AST. Pt may also receive Percocet for Ha. Continue to monitor.